# Patient Record
(demographics unavailable — no encounter records)

---

## 2025-01-22 NOTE — DISCUSSION/SUMMARY
[FreeTextEntry1] : He agrees that improvement in his tremor and gait is a benefit of the carbidopa levodopa and that he will continue this medication.  I have taken a video of his walking and the lack of tremor today.  For  For sciatica, I reminded him of his back surgery from L3-L5 with the instrumentation.  The only option for him is to try the pain management again which appeared to have worked the last time.  I have given her meloxicam for a few days.

## 2025-01-22 NOTE — HISTORY OF PRESENT ILLNESS
[FreeTextEntry1] : Followed by Dr. Oneil for back pain radiating into the legs as well as Parkinson's disease.  He has been referred for evaluation for deep brain stimulation. 4/9/2024: He reports that his tremor is unchanged and his problems are unchanged despite increasing the dose of the medications.  9/23/2024: He does not complain of tremor but does complain of burning in his feet and restless legs.  He admits to drooling at times and choking occasionally.  He notices his gait is not normal but he is independent at home and uses a cane when he walks outside.  He [place] a subway but today he used a taxi to come independently to the office for his appointment. 1/22/2025: He returns complaining that the medications do not work at home.  His most important complaint is severe back pain radiating into the back of his thigh that he calls sciatica.  It was worse earlier last week and now is slightly better.  He was unable to get out of bed because of the severe pain last week.  He has had this pain before and he was sent to physical therapy but could not complete all the sessions because he has co-pay each time which she cannot afford.  He also went to a different pain management specialist last year in April and received injections that did help him.  The surgeon who did the surgery and for spine is no longer in the area and moved to [place].  He had seen Dr. Vinson and Dr. Caron Lackey a previous MRI of the cervical spine had noted cervical stenosis.  He does have bladder problems that he consults urology for treatment.  He also does have difficulty walking but the difficulty walking has improved significantly with regulation of the days dosage of carbidopa levodopa.  He no longer complains of tremor.  He does have burning and tingling in the legs.

## 2025-01-22 NOTE — PHYSICAL EXAM
[Person] : oriented to person [Place] : oriented to place [Time] : oriented to time [Concentration Intact] : normal concentrating ability [Visual Intact] : visual attention was ~T not ~L decreased [Naming Objects] : no difficulty naming common objects [Repeating Phrases] : no difficulty repeating a phrase [Writing A Sentence] : no difficulty writing a sentence [Fluency] : fluency intact [Comprehension] : comprehension intact [Reading] : reading intact [Past History] : adequate knowledge of personal past history [Cranial Nerves Optic (II)] : visual acuity intact bilaterally,  visual fields full to confrontation, pupils equal round and reactive to light [Cranial Nerves Oculomotor (III)] : extraocular motion intact [Cranial Nerves Trigeminal (V)] : facial sensation intact symmetrically [Cranial Nerves Facial (VII)] : face symmetrical [Cranial Nerves Vestibulocochlear (VIII)] : hearing was intact bilaterally [Cranial Nerves Glossopharyngeal (IX)] : tongue and palate midline [Cranial Nerves Accessory (XI - Cranial And Spinal)] : head turning and shoulder shrug symmetric [Cranial Nerves Hypoglossal (XII)] : there was no tongue deviation with protrusion [Motor Strength] : muscle strength was normal in all four extremities [No Muscle Atrophy] : normal bulk in all four extremities [2] : Falling - 2 [3] : Sensory - 3 [1 - Slight: Loss of modulation, diction, or volume, but still all words easy to understand] : Speech - 1 [1 - Slight: Minimal masked facies manifested only by decreased frequency of blinking] : Facial expression - 1 [2 - Mild: Rigidity detected without the activation maneuver, but full range of motion is easily achieved.] : Rigidity: neck - 2 [1 - Slight: Rigidity only detected with activation maneuver] : Rigidity - Lower extremity: left - 1 [1 - Slight: Any of the following: a) the regular rhythm is broken with one or two interruptions or hesitations of the movement; b) slight slowing; c) the amplitude decrements near the end of the task.] : Hand movements: left - 1 [1 - Slight: Any of the following: a) the reqular rhytm is broken with one or two interruptions or hesitations of the movement; b) slight slowing; c) the amplitude decrements near the end of the sequence.] : Pronation-supination movements of hands: left - 1 [1 - Slight: Any of the following: a) the reqular rhythm is broken with one or two interruptions or hesitations of the tapping movement; b) slight slowing; c) the amplitude decrements near the end of the 10 taps.] : Toe tapping: left - 1 [1 - Slight: Any of the following: a) the regular rhythm is broken with one or two interruptions or hesitations of the movement; b) slight slowing; c) amplitude decrements near the end of the task.] : Leg agility: left - 1 [0 - Normal: No problems. Able to arise quickly without hesitation] : Arise from chair - 0 [2 - Mild: Independent walking but with substantial gait impairment.] : Gait - 2 [0 - Normal: No freezing] : Freezing of gait- 0 [2 - Mild: More than 5 steps, but subject recovers unaided] : Postural stability - 2 [2 - Mild: Definite flexion, scoliosis or leaning to one side, but patient can correct posture to normal posture when asked to do so.] : Posture - 2 [2 - Mild: Mild global slowness and poverty of spontaneous movements.] : Body bradykinesia - 2 [1 - Slight: Tremor is present but less than 1 cm in amplitude.] : Kinetic tremor of the hands: left - 1 [0 - Normal: No tremor.] : Tremor Amplitude: Lip/Jaw - 0 [3 - Moderate: >= 3 cm but < 10 cm in maximal amplitude.] : Rest tremor amplitude - Upper extremity: left - 3 [1 - Slight: < 1 cm in maximal amplitude] : Rest tremor amplitude - Lower extremity: left - 1 [1 - Slight: Tremor at rest is present <= 25% of the entire examination period.] : Constancy of rest tremor  - 1 [0] : Anorexia, nausea, vomiting - 0 [1] : Symptomatic Orthostasis - 1 [Sensation Tactile Decrease] : light touch was intact [Abnormal Walk] : normal gait [Balance] : balance was intact [Past-pointing] : there was no past-pointing [Tremor] : a tremor present [Dysdiadochokinesia Bilaterally] : present bilaterally [Coordination - Dysmetria Impaired Finger-to-Nose Bilateral] : not present [Coordination - Dysmetria Impaired Heel-to-Shin Bilateral] : not present [3+] : Ankle jerk right 3+ [2+] : Ankle jerk left 2+ [Plantar Reflex Right Only] : abnormal on the right [Plantar Reflex Left Only] : normal on the left [FreeTextEntry9] : 1/31/2024 [de-identified] : 199 [FreeTextEntry3] : 19 [FreeTextEntry5] : 43 [FreeTextEntry7] : 63 [de-identified] : 3 [de-identified] : 40 [Sclera] : the sclera and conjunctiva were normal [PERRL With Normal Accommodation] : pupils were equal in size, round, reactive to light, with normal accommodation [Extraocular Movements] : extraocular movements were intact [Outer Ear] : the ears and nose were normal in appearance [Oropharynx] : the oropharynx was normal [Neck Appearance] : the appearance of the neck was normal [Neck Cervical Mass (___cm)] : no neck mass was observed [Jugular Venous Distention Increased] : there was no jugular-venous distention [Thyroid Diffuse Enlargement] : the thyroid was not enlarged [Thyroid Nodule] : there were no palpable thyroid nodules [Auscultation Breath Sounds / Voice Sounds] : lungs were clear to auscultation bilaterally [Heart Rate And Rhythm] : heart rate was normal and rhythm regular [Heart Sounds] : normal S1 and S2 [Heart Sounds Gallop] : no gallops [Murmurs] : no murmurs [Heart Sounds Pericardial Friction Rub] : no pericardial rub [Full Pulse] : the pedal pulses are present [Edema] : there was no peripheral edema [Bowel Sounds] : normal bowel sounds [Abdomen Soft] : soft [Abdomen Tenderness] : non-tender [] : no hepato-splenomegaly [Abdomen Mass (___ Cm)] : no abdominal mass palpated [FreeTextEntry1] : SLRT NEGATIVE

## 2025-03-17 NOTE — HISTORY OF PRESENT ILLNESS
[FreeTextEntry1] : back pain  [de-identified] : 3/13/25: 72-year-old male with PMH Parkinson's disease, chronic lower back pain, history of L3-4, L4-5 fusion in 2015. He has been seen by Teo Mendoza and Thad. Reports worsening back pain radiating to bilateral lower extremities associated with tingling in both feet, right leg worse than the left. He states that he never had any significant improvement after surgery. Has posterior neck pain, numbness/tingling both hands, difficulty with balance. He has done physical therapy without improvement. Taking Gabapentin and Cymbalta. States he had an epidural injection about 3 months ago with mild pain relief. Following up to review most recent updated CT results for lumbar spine.  5/28/24:  72-year-old male with PMH Parkinson's disease, chronic lower back pain, history of L3-4, L4-5 fusion in 2015. He has been seen by Teo Mendoza and Thad. His back pain radiates to bilateral lower extremities associated with tingling in both feet. He states that he never had any significant improvement after surgery. He also reports posterior neck pain, numbness/tingling both hands, difficulty with balance. He has done physical therapy without improvement. Taking Gabapentin and Cymbalta.   3/28/24 71-year-old male with PMH Parkinson's disease, chronic lower back pain, history of L3-4, L4-5 fusion in 2014, presenting for neurosurgical evaluation of back and neck pain.  He has been seen by Teo Mendoza and Thad. His back pain radiates to bilateral lower extremities associated with tingling in both feet. He states that he never had any significant improvement after surgery. He also reports posterior neck pain, numbness/tingling both hands, difficulty with balance. He has done physical therapy without improvement. Taking Gabapentin and Cymbalta. No recent imaging studies for his lumbar spine. He did have an MRI about 5 years ago.  MR cervical spine with multilevel cervical spondylosis more notable at C5-6 level with moderate to severe bilateral neuroforaminal stenosis.

## 2025-03-17 NOTE — PHYSICAL EXAM
[General Appearance - Alert] : alert [General Appearance - In No Acute Distress] : in no acute distress [Oriented To Time, Place, And Person] : oriented to person, place, and time [Cranial Nerves Optic (II)] : visual acuity intact bilaterally,  pupils equal round and reactive to light [Cranial Nerves Oculomotor (III)] : extraocular motion intact [Cranial Nerves Facial (VII)] : face symmetrical [Cranial Nerves Vestibulocochlear (VIII)] : hearing was intact bilaterally [Cranial Nerves Accessory (XI - Cranial And Spinal)] : head turning and shoulder shrug symmetric [Cranial Nerves Hypoglossal (XII)] : there was no tongue deviation with protrusion [Motor Tone] : muscle tone was normal in all four extremities [Motor Strength] : muscle strength was normal in all four extremities [Sensation Tactile Decrease] : light touch was intact [Balance] : balance was intact [3+] : Ankle jerk right 3+ [2+] : Ankle jerk left 2+ [Full ROM] : full ROM [No Visual Abnormalities] : no visible abnormailities [Sclera] : the sclera and conjunctiva were normal [PERRL With Normal Accommodation] : pupils were equal in size, round, reactive to light, with normal accommodation [Extraocular Movements] : extraocular movements were intact [Outer Ear] : the ears and nose were normal in appearance [Hearing Threshold Finger Rub Not [place]] : hearing was normal [Neck Appearance] : the appearance of the neck was normal [] : no respiratory distress [Abnormal Walk] : normal gait [Skin Color & Pigmentation] : normal skin color and pigmentation [Straight-Leg Raise Test - Right] : straight leg raise  of the right leg was negative [FreeTextEntry8] : ambulates with cane [Straight-Leg Raise Test - Left] : straight leg raise of the left leg was negative [Able to toe walk] : the patient was not able to toe walk [Able to heel walk] : the patient was not able to heel walk

## 2025-03-17 NOTE — PHYSICAL EXAM
[General Appearance - Alert] : alert [General Appearance - In No Acute Distress] : in no acute distress [Oriented To Time, Place, And Person] : oriented to person, place, and time [Cranial Nerves Optic (II)] : visual acuity intact bilaterally,  pupils equal round and reactive to light [Cranial Nerves Oculomotor (III)] : extraocular motion intact [Cranial Nerves Facial (VII)] : face symmetrical [Cranial Nerves Vestibulocochlear (VIII)] : hearing was intact bilaterally [Cranial Nerves Accessory (XI - Cranial And Spinal)] : head turning and shoulder shrug symmetric [Cranial Nerves Hypoglossal (XII)] : there was no tongue deviation with protrusion [Motor Tone] : muscle tone was normal in all four extremities [Motor Strength] : muscle strength was normal in all four extremities [Sensation Tactile Decrease] : light touch was intact [Balance] : balance was intact [3+] : Ankle jerk right 3+ [2+] : Ankle jerk left 2+ [Full ROM] : full ROM [No Visual Abnormalities] : no visible abnormailities [Sclera] : the sclera and conjunctiva were normal [PERRL With Normal Accommodation] : pupils were equal in size, round, reactive to light, with normal accommodation [Extraocular Movements] : extraocular movements were intact [Outer Ear] : the ears and nose were normal in appearance [Hearing Threshold Finger Rub Not [place]] : hearing was normal [Neck Appearance] : the appearance of the neck was normal [] : no respiratory distress [Abnormal Walk] : normal gait [Skin Color & Pigmentation] : normal skin color and pigmentation [Straight-Leg Raise Test - Right] : straight leg raise  of the right leg was negative [FreeTextEntry8] : ambulates with cane [Straight-Leg Raise Test - Left] : straight leg raise of the left leg was negative [Able to toe walk] : the patient was not able to toe walk [Able to heel walk] : the patient was not able to heel walk

## 2025-03-17 NOTE — ASSESSMENT
[FreeTextEntry1] : 71 y/o M with PMH Parkinson's disease, lumbar and cervical radiculopathy, history of L3-4, L4-5 fusion in 2015. His back pain radiates to bilateral lower extremities associated with burning and tingling in both feet. Neck pain associated with tingling in both hands. He has done physical therapy without improvement. His lower back pain is more bothersome than his neck rating the lower back pain 10/10 in severity. Discussed multiple non surgical treatment options and I recommend he returns to pain management for a L2-3 trans foraminal block right or SCS trial.

## 2025-03-17 NOTE — HISTORY OF PRESENT ILLNESS
[FreeTextEntry1] : back pain  [de-identified] : 3/13/25: 72-year-old male with PMH Parkinson's disease, chronic lower back pain, history of L3-4, L4-5 fusion in 2015. He has been seen by Teo Mendoza and Thad. Reports worsening back pain radiating to bilateral lower extremities associated with tingling in both feet, right leg worse than the left. He states that he never had any significant improvement after surgery. Has posterior neck pain, numbness/tingling both hands, difficulty with balance. He has done physical therapy without improvement. Taking Gabapentin and Cymbalta. States he had an epidural injection about 3 months ago with mild pain relief. Following up to review most recent updated CT results for lumbar spine.  5/28/24:  72-year-old male with PMH Parkinson's disease, chronic lower back pain, history of L3-4, L4-5 fusion in 2015. He has been seen by Teo Mendoza and Thad. His back pain radiates to bilateral lower extremities associated with tingling in both feet. He states that he never had any significant improvement after surgery. He also reports posterior neck pain, numbness/tingling both hands, difficulty with balance. He has done physical therapy without improvement. Taking Gabapentin and Cymbalta.   3/28/24 71-year-old male with PMH Parkinson's disease, chronic lower back pain, history of L3-4, L4-5 fusion in 2014, presenting for neurosurgical evaluation of back and neck pain.  He has been seen by Teo Mendoza and Thad. His back pain radiates to bilateral lower extremities associated with tingling in both feet. He states that he never had any significant improvement after surgery. He also reports posterior neck pain, numbness/tingling both hands, difficulty with balance. He has done physical therapy without improvement. Taking Gabapentin and Cymbalta. No recent imaging studies for his lumbar spine. He did have an MRI about 5 years ago.  MR cervical spine with multilevel cervical spondylosis more notable at C5-6 level with moderate to severe bilateral neuroforaminal stenosis.

## 2025-03-17 NOTE — HISTORY OF PRESENT ILLNESS
[FreeTextEntry1] : back pain  [de-identified] : 3/13/25: 72-year-old male with PMH Parkinson's disease, chronic lower back pain, history of L3-4, L4-5 fusion in 2015. He has been seen by Teo Mendoza and Thad. Reports worsening back pain radiating to bilateral lower extremities associated with tingling in both feet, right leg worse than the left. He states that he never had any significant improvement after surgery. Has posterior neck pain, numbness/tingling both hands, difficulty with balance. He has done physical therapy without improvement. Taking Gabapentin and Cymbalta. States he had an epidural injection about 3 months ago with mild pain relief. Following up to review most recent updated CT results for lumbar spine.  5/28/24:  72-year-old male with PMH Parkinson's disease, chronic lower back pain, history of L3-4, L4-5 fusion in 2015. He has been seen by Teo Mendoza and Thad. His back pain radiates to bilateral lower extremities associated with tingling in both feet. He states that he never had any significant improvement after surgery. He also reports posterior neck pain, numbness/tingling both hands, difficulty with balance. He has done physical therapy without improvement. Taking Gabapentin and Cymbalta.   3/28/24 71-year-old male with PMH Parkinson's disease, chronic lower back pain, history of L3-4, L4-5 fusion in 2014, presenting for neurosurgical evaluation of back and neck pain.  He has been seen by Teo Mendoza and Thad. His back pain radiates to bilateral lower extremities associated with tingling in both feet. He states that he never had any significant improvement after surgery. He also reports posterior neck pain, numbness/tingling both hands, difficulty with balance. He has done physical therapy without improvement. Taking Gabapentin and Cymbalta. No recent imaging studies for his lumbar spine. He did have an MRI about 5 years ago.  MR cervical spine with multilevel cervical spondylosis more notable at C5-6 level with moderate to severe bilateral neuroforaminal stenosis.

## 2025-03-17 NOTE — ASSESSMENT
[FreeTextEntry1] : 73 y/o M with PMH Parkinson's disease, lumbar and cervical radiculopathy, history of L3-4, L4-5 fusion in 2015. His back pain radiates to bilateral lower extremities associated with burning and tingling in both feet. Neck pain associated with tingling in both hands. He has done physical therapy without improvement. His lower back pain is more bothersome than his neck rating the lower back pain 10/10 in severity. Discussed multiple non surgical treatment options and I recommend he returns to pain management for a L2-3 trans foraminal block right or SCS trial.

## 2025-03-17 NOTE — RESULTS/DATA
[FreeTextEntry1] : CT Lumbar 6/4/24: Posterior instrumented fusion from L3 through L5 with solid anterior and posterior osseous fusion and no hardware complication.There are tracks within the left-sided pedicle of L4 from prior hardware placement and removal. There is also prominent productive change/fusion mass emanating from this pedicle and the disc space causing moderate left osseous foraminal narrowing. Mild disc bulging and retrolisthesis at L5-S1 with facet arthrosis resulting in moderate to severe right and moderate left foraminal narrowing.

## 2025-03-17 NOTE — PHYSICAL EXAM
[General Appearance - Alert] : alert [General Appearance - In No Acute Distress] : in no acute distress [Oriented To Time, Place, And Person] : oriented to person, place, and time [Cranial Nerves Optic (II)] : visual acuity intact bilaterally,  pupils equal round and reactive to light [Cranial Nerves Oculomotor (III)] : extraocular motion intact [Cranial Nerves Facial (VII)] : face symmetrical [Cranial Nerves Vestibulocochlear (VIII)] : hearing was intact bilaterally [Cranial Nerves Accessory (XI - Cranial And Spinal)] : head turning and shoulder shrug symmetric [Cranial Nerves Hypoglossal (XII)] : there was no tongue deviation with protrusion [Motor Tone] : muscle tone was normal in all four extremities [Motor Strength] : muscle strength was normal in all four extremities [Sensation Tactile Decrease] : light touch was intact [Balance] : balance was intact [3+] : Ankle jerk right 3+ [2+] : Ankle jerk left 2+ [Full ROM] : full ROM [No Visual Abnormalities] : no visible abnormailities [Sclera] : the sclera and conjunctiva were normal [PERRL With Normal Accommodation] : pupils were equal in size, round, reactive to light, with normal accommodation [Extraocular Movements] : extraocular movements were intact [Outer Ear] : the ears and nose were normal in appearance [Hearing Threshold Finger Rub Not [place]] : hearing was normal [Neck Appearance] : the appearance of the neck was normal [] : no respiratory distress [Abnormal Walk] : normal gait [Skin Color & Pigmentation] : normal skin color and pigmentation [Straight-Leg Raise Test - Right] : straight leg raise  of the right leg was negative [FreeTextEntry8] : ambulates with cane [Straight-Leg Raise Test - Left] : straight leg raise of the left leg was negative [Able to toe walk] : the patient was not able to toe walk [Able to heel walk] : the patient was not able to heel walk

## 2025-03-17 NOTE — PHYSICAL EXAM
[General Appearance - Alert] : alert [General Appearance - In No Acute Distress] : in no acute distress [Oriented To Time, Place, And Person] : oriented to person, place, and time [Cranial Nerves Optic (II)] : visual acuity intact bilaterally,  pupils equal round and reactive to light [Cranial Nerves Oculomotor (III)] : extraocular motion intact [Cranial Nerves Facial (VII)] : face symmetrical [Cranial Nerves Vestibulocochlear (VIII)] : hearing was intact bilaterally [Cranial Nerves Accessory (XI - Cranial And Spinal)] : head turning and shoulder shrug symmetric [Cranial Nerves Hypoglossal (XII)] : there was no tongue deviation with protrusion [Motor Tone] : muscle tone was normal in all four extremities [Motor Strength] : muscle strength was normal in all four extremities [Sensation Tactile Decrease] : light touch was intact [Balance] : balance was intact [3+] : Ankle jerk right 3+ [2+] : Ankle jerk left 2+ [Full ROM] : full ROM [No Visual Abnormalities] : no visible abnormailities [Sclera] : the sclera and conjunctiva were normal [PERRL With Normal Accommodation] : pupils were equal in size, round, reactive to light, with normal accommodation [Extraocular Movements] : extraocular movements were intact [Outer Ear] : the ears and nose were normal in appearance [Hearing Threshold Finger Rub Not [place]] : hearing was normal [Neck Appearance] : the appearance of the neck was normal [] : no respiratory distress [Abnormal Walk] : normal gait [Skin Color & Pigmentation] : normal skin color and pigmentation [Straight-Leg Raise Test - Right] : straight leg raise  of the right leg was negative [FreeTextEntry8] : ambulates with cane [Straight-Leg Raise Test - Left] : straight leg raise of the left leg was negative [Able to toe walk] : the patient was not able to toe walk [Able to heel walk] : the patient was not able to heel walk

## 2025-03-17 NOTE — HISTORY OF PRESENT ILLNESS
[FreeTextEntry1] : back pain  [de-identified] : 3/13/25: 72-year-old male with PMH Parkinson's disease, chronic lower back pain, history of L3-4, L4-5 fusion in 2015. He has been seen by Teo Mendoza and Thad. Reports worsening back pain radiating to bilateral lower extremities associated with tingling in both feet, right leg worse than the left. He states that he never had any significant improvement after surgery. Has posterior neck pain, numbness/tingling both hands, difficulty with balance. He has done physical therapy without improvement. Taking Gabapentin and Cymbalta. States he had an epidural injection about 3 months ago with mild pain relief. Following up to review most recent updated CT results for lumbar spine.  5/28/24:  72-year-old male with PMH Parkinson's disease, chronic lower back pain, history of L3-4, L4-5 fusion in 2015. He has been seen by Teo Mendoza and Thad. His back pain radiates to bilateral lower extremities associated with tingling in both feet. He states that he never had any significant improvement after surgery. He also reports posterior neck pain, numbness/tingling both hands, difficulty with balance. He has done physical therapy without improvement. Taking Gabapentin and Cymbalta.   3/28/24 71-year-old male with PMH Parkinson's disease, chronic lower back pain, history of L3-4, L4-5 fusion in 2014, presenting for neurosurgical evaluation of back and neck pain.  He has been seen by Teo Mendoza and Thad. His back pain radiates to bilateral lower extremities associated with tingling in both feet. He states that he never had any significant improvement after surgery. He also reports posterior neck pain, numbness/tingling both hands, difficulty with balance. He has done physical therapy without improvement. Taking Gabapentin and Cymbalta. No recent imaging studies for his lumbar spine. He did have an MRI about 5 years ago.  MR cervical spine with multilevel cervical spondylosis more notable at C5-6 level with moderate to severe bilateral neuroforaminal stenosis.

## 2025-03-26 NOTE — HISTORY OF PRESENT ILLNESS
[FreeTextEntry1] : Followed by Dr. Oneil for back pain radiating into the legs as well as Parkinson's disease.  He has been referred for evaluation for deep brain stimulation. 4/9/2024: He reports that his tremor is unchanged and his problems are unchanged despite increasing the dose of the medications.  9/23/2024: He does not complain of tremor but does complain of burning in his feet and restless legs.  He admits to drooling at times and choking occasionally.  He notices his gait is not normal but he is independent at home and uses a cane when he walks outside.  [place] a subway but today he used a taxi to come independently to the office for his appointment. 1/22/2025: He returns complaining that the medications do not work at home.  His most important complaint is severe back pain radiating into the back of his thigh that he calls sciatica.  It was worse earlier last week and now is slightly better.  He was unable to get out of bed because of the severe pain last week.  He has had this pain before and he was sent to physical therapy but could not complete all the sessions because he has co-pay each time which she cannot afford.  He also went to a different pain management specialist last year in April and received injections that did help him.  The surgeon who did the surgery and for spine is no longer in the area and moved to [place].  He had seen Dr. Vinson and Dr. Caron Lackey a previous MRI of the cervical spine had noted cervical stenosis.  He does have bladder problems that he consults urology for treatment.  He also does have difficulty walking but the difficulty walking has improved significantly with regulation of the days dosage of carbidopa levodopa.  He no longer complains of tremor.  He does have burning and tingling in the legs. Verbal consent given on 03/26/2025 at 15:36 by SHANICE KUO, ~  ~. 3/26/2025: He complains he continues to shake intermittently.  But he has not maintained a record of when he begins shaking and when the shaking stops.  This request had been made at the time of the last visit to discover whether his off phenomenon were end of dose off phenomenon or not.  He has seen Dr. Vinson regarding his spine disease (lumbar and cervical spine) and is following up with Dr. Vinson.

## 2025-03-26 NOTE — DISCUSSION/SUMMARY
[FreeTextEntry1] : Unfortunately he could not hear clearly and could not repeat my instructions to keep a symptom diary with time of medication administration and onset as well as offset of tremor.  I shall arrange for an in face appointment to help him understand.  He might need home care to manage monitor and manage his condition because of his age and hearing problems.  Unfortunately also his phone call was disconnected as we were trying to convert to a telemetry prompt.

## 2025-05-22 NOTE — HISTORY OF PRESENT ILLNESS
[FreeTextEntry1] : Followed by Dr. Oneil for back pain radiating into the legs as well as Parkinson's disease.  He has been referred for evaluation for deep brain stimulation. 4/9/2024: He reports that his tremor is unchanged and his problems are unchanged despite increasing the dose of the medications.  9/23/2024: He does not complain of tremor but does complain of burning in his feet and restless legs.  He admits to drooling at times and choking occasionally.  He notices his gait is not normal but he is independent at home and uses a cane when he walks outside.  [place] he used a taxi to come independently to the office for his appointment. 1/22/2025: He returns complaining that the medications do not work at home.  His most important complaint is severe back pain radiating into the back of his thigh that he calls sciatica.  It was worse earlier last week and now is slightly better.  He was unable to get out of bed because of the severe pain last week.  He has had this pain before and he was sent to physical therapy but could not complete all the sessions because he has co-pay each time which she cannot afford.  He also went to a different pain management specialist last year in April and received injections that did help him.  The surgeon who did the surgery and for spine is no longer in the area and moved to [place].  He had seen Dr. Vinson and Dr. Caron Lackey a previous MRI of the cervical spine had noted cervical stenosis.  He does have bladder problems that he consults urology for treatment.  He also does have difficulty walking but the difficulty walking has improved significantly with regulation of the days dosage of carbidopa levodopa.  He no longer complains of tremor.  He does have burning and tingling in the legs. Verbal consent given on 03/26/2025 at 15:36 by SHANICE KUO, ~  ~. 3/26/2025: He complains he continues to shake intermittently.  But he has not maintained a record of when he begins shaking and when the shaking stops.  This request had been made at the time of the last visit to discover whether his off phenomenon were end of dose off phenomenon or not.  He has seen Dr. Vinson regarding his spine disease (lumbar and cervical spine) and is following up with Dr. Vinson. 5/14/2025: His wife was present.  He reports that he is worse.  He does not have pain in his feet  He reports

## 2025-05-22 NOTE — PHYSICAL EXAM
[Person] : oriented to person [Place] : oriented to place [Time] : oriented to time [Concentration Intact] : normal concentrating ability [Visual Intact] : visual attention was ~T not ~L decreased [Naming Objects] : no difficulty naming common objects [Repeating Phrases] : no difficulty repeating a phrase [Writing A Sentence] : no difficulty writing a sentence [Fluency] : fluency intact [Comprehension] : comprehension intact [Reading] : reading intact [Past History] : adequate knowledge of personal past history [Cranial Nerves Optic (II)] : visual acuity intact bilaterally,  visual fields full to confrontation, pupils equal round and reactive to light [Cranial Nerves Oculomotor (III)] : extraocular motion intact [Cranial Nerves Trigeminal (V)] : facial sensation intact symmetrically [Cranial Nerves Facial (VII)] : face symmetrical [Cranial Nerves Vestibulocochlear (VIII)] : hearing was intact bilaterally [Cranial Nerves Glossopharyngeal (IX)] : tongue and palate midline [Cranial Nerves Accessory (XI - Cranial And Spinal)] : head turning and shoulder shrug symmetric [Cranial Nerves Hypoglossal (XII)] : there was no tongue deviation with protrusion [Motor Strength] : muscle strength was normal in all four extremities [No Muscle Atrophy] : normal bulk in all four extremities [2] : Falling - 2 [3] : Sensory - 3 [1 - Slight: Loss of modulation, diction, or volume, but still all words easy to understand] : Speech - 1 [1 - Slight: Minimal masked facies manifested only by decreased frequency of blinking] : Facial expression - 1 [2 - Mild: Rigidity detected without the activation maneuver, but full range of motion is easily achieved.] : Rigidity: neck - 2 [1 - Slight: Rigidity only detected with activation maneuver] : Rigidity - Lower extremity: left - 1 [1 - Slight: Any of the following: a) the regular rhythm is broken with one or two interruptions or hesitations of the movement; b) slight slowing; c) the amplitude decrements near the end of the task.] : Hand movements: left - 1 [1 - Slight: Any of the following: a) the reqular rhytm is broken with one or two interruptions or hesitations of the movement; b) slight slowing; c) the amplitude decrements near the end of the sequence.] : Pronation-supination movements of hands: left - 1 [1 - Slight: Any of the following: a) the reqular rhythm is broken with one or two interruptions or hesitations of the tapping movement; b) slight slowing; c) the amplitude decrements near the end of the 10 taps.] : Toe tapping: left - 1 [1 - Slight: Any of the following: a) the regular rhythm is broken with one or two interruptions or hesitations of the movement; b) slight slowing; c) amplitude decrements near the end of the task.] : Leg agility: left - 1 [0 - Normal: No problems. Able to arise quickly without hesitation] : Arise from chair - 0 [2 - Mild: Independent walking but with substantial gait impairment.] : Gait - 2 [0 - Normal: No freezing] : Freezing of gait- 0 [2 - Mild: More than 5 steps, but subject recovers unaided] : Postural stability - 2 [2 - Mild: Definite flexion, scoliosis or leaning to one side, but patient can correct posture to normal posture when asked to do so.] : Posture - 2 [2 - Mild: Mild global slowness and poverty of spontaneous movements.] : Body bradykinesia - 2 [1 - Slight: Tremor is present but less than 1 cm in amplitude.] : Kinetic tremor of the hands: left - 1 [0 - Normal: No tremor.] : Tremor Amplitude: Lip/Jaw - 0 [3 - Moderate: >= 3 cm but < 10 cm in maximal amplitude.] : Rest tremor amplitude - Upper extremity: left - 3 [1 - Slight: < 1 cm in maximal amplitude] : Rest tremor amplitude - Lower extremity: left - 1 [1 - Slight: Tremor at rest is present <= 25% of the entire examination period.] : Constancy of rest tremor  - 1 [0] : Anorexia, nausea, vomiting - 0 [1] : Symptomatic Orthostasis - 1 [Sensation Tactile Decrease] : light touch was intact [Abnormal Walk] : normal gait [Balance] : balance was intact [Tremor] : a tremor present [Dysdiadochokinesia Bilaterally] : present bilaterally [3+] : Ankle jerk right 3+ [2+] : Ankle jerk left 2+ [Plantar Reflex Right Only] : abnormal on the right [Sclera] : the sclera and conjunctiva were normal [PERRL With Normal Accommodation] : pupils were equal in size, round, reactive to light, with normal accommodation [Extraocular Movements] : extraocular movements were intact [Neck Appearance] : the appearance of the neck was normal [Neck Cervical Mass (___cm)] : no neck mass was observed [Jugular Venous Distention Increased] : there was no jugular-venous distention [Thyroid Diffuse Enlargement] : the thyroid was not enlarged [Thyroid Nodule] : there were no palpable thyroid nodules [] : no respiratory distress [Auscultation Breath Sounds / Voice Sounds] : lungs were clear to auscultation bilaterally [Heart Rate And Rhythm] : heart rate was normal and rhythm regular [Heart Sounds] : normal S1 and S2 [Heart Sounds Gallop] : no gallops [Murmurs] : no murmurs [Heart Sounds Pericardial Friction Rub] : no pericardial rub [Full Pulse] : the pedal pulses are present [Edema] : there was no peripheral edema [Past-pointing] : there was no past-pointing [Coordination - Dysmetria Impaired Finger-to-Nose Bilateral] : not present [Coordination - Dysmetria Impaired Heel-to-Shin Bilateral] : not present [Plantar Reflex Left Only] : normal on the left [FreeTextEntry9] : 1/31/2024 [de-identified] : [id number] [FreeTextEntry1] : 1 [FreeTextEntry3] : 19 [FreeTextEntry5] : 43 [FreeTextEntry7] : 63 [de-identified] : 3 [de-identified] : 40

## 2025-05-22 NOTE — PHYSICAL EXAM
[Person] : oriented to person [Place] : oriented to place [Time] : oriented to time [Concentration Intact] : normal concentrating ability [Visual Intact] : visual attention was ~T not ~L decreased [Naming Objects] : no difficulty naming common objects [Repeating Phrases] : no difficulty repeating a phrase [Writing A Sentence] : no difficulty writing a sentence [Fluency] : fluency intact [Comprehension] : comprehension intact [Reading] : reading intact [Past History] : adequate knowledge of personal past history [Cranial Nerves Optic (II)] : visual acuity intact bilaterally,  visual fields full to confrontation, pupils equal round and reactive to light [Cranial Nerves Oculomotor (III)] : extraocular motion intact [Cranial Nerves Trigeminal (V)] : facial sensation intact symmetrically [Cranial Nerves Facial (VII)] : face symmetrical [Cranial Nerves Vestibulocochlear (VIII)] : hearing was intact bilaterally [Cranial Nerves Glossopharyngeal (IX)] : tongue and palate midline [Cranial Nerves Accessory (XI - Cranial And Spinal)] : head turning and shoulder shrug symmetric [Cranial Nerves Hypoglossal (XII)] : there was no tongue deviation with protrusion [Motor Strength] : muscle strength was normal in all four extremities [No Muscle Atrophy] : normal bulk in all four extremities [2] : Falling - 2 [3] : Sensory - 3 [1 - Slight: Loss of modulation, diction, or volume, but still all words easy to understand] : Speech - 1 [1 - Slight: Minimal masked facies manifested only by decreased frequency of blinking] : Facial expression - 1 [2 - Mild: Rigidity detected without the activation maneuver, but full range of motion is easily achieved.] : Rigidity: neck - 2 [1 - Slight: Rigidity only detected with activation maneuver] : Rigidity - Lower extremity: left - 1 [1 - Slight: Any of the following: a) the regular rhythm is broken with one or two interruptions or hesitations of the movement; b) slight slowing; c) the amplitude decrements near the end of the task.] : Hand movements: left - 1 [1 - Slight: Any of the following: a) the reqular rhytm is broken with one or two interruptions or hesitations of the movement; b) slight slowing; c) the amplitude decrements near the end of the sequence.] : Pronation-supination movements of hands: left - 1 [1 - Slight: Any of the following: a) the reqular rhythm is broken with one or two interruptions or hesitations of the tapping movement; b) slight slowing; c) the amplitude decrements near the end of the 10 taps.] : Toe tapping: left - 1 [1 - Slight: Any of the following: a) the regular rhythm is broken with one or two interruptions or hesitations of the movement; b) slight slowing; c) amplitude decrements near the end of the task.] : Leg agility: left - 1 [0 - Normal: No problems. Able to arise quickly without hesitation] : Arise from chair - 0 [2 - Mild: Independent walking but with substantial gait impairment.] : Gait - 2 [0 - Normal: No freezing] : Freezing of gait- 0 [2 - Mild: More than 5 steps, but subject recovers unaided] : Postural stability - 2 [2 - Mild: Definite flexion, scoliosis or leaning to one side, but patient can correct posture to normal posture when asked to do so.] : Posture - 2 [2 - Mild: Mild global slowness and poverty of spontaneous movements.] : Body bradykinesia - 2 [1 - Slight: Tremor is present but less than 1 cm in amplitude.] : Kinetic tremor of the hands: left - 1 [0 - Normal: No tremor.] : Tremor Amplitude: Lip/Jaw - 0 [3 - Moderate: >= 3 cm but < 10 cm in maximal amplitude.] : Rest tremor amplitude - Upper extremity: left - 3 [1 - Slight: < 1 cm in maximal amplitude] : Rest tremor amplitude - Lower extremity: left - 1 [1 - Slight: Tremor at rest is present <= 25% of the entire examination period.] : Constancy of rest tremor  - 1 [0] : Anorexia, nausea, vomiting - 0 [1] : Symptomatic Orthostasis - 1 [Sensation Tactile Decrease] : light touch was intact [Abnormal Walk] : normal gait [Balance] : balance was intact [Tremor] : a tremor present [Dysdiadochokinesia Bilaterally] : present bilaterally [3+] : Ankle jerk right 3+ [2+] : Ankle jerk left 2+ [Plantar Reflex Right Only] : abnormal on the right [Sclera] : the sclera and conjunctiva were normal [PERRL With Normal Accommodation] : pupils were equal in size, round, reactive to light, with normal accommodation [Extraocular Movements] : extraocular movements were intact [Neck Appearance] : the appearance of the neck was normal [Neck Cervical Mass (___cm)] : no neck mass was observed [Jugular Venous Distention Increased] : there was no jugular-venous distention [Thyroid Diffuse Enlargement] : the thyroid was not enlarged [Thyroid Nodule] : there were no palpable thyroid nodules [] : no respiratory distress [Auscultation Breath Sounds / Voice Sounds] : lungs were clear to auscultation bilaterally [Heart Rate And Rhythm] : heart rate was normal and rhythm regular [Heart Sounds] : normal S1 and S2 [Heart Sounds Gallop] : no gallops [Murmurs] : no murmurs [Heart Sounds Pericardial Friction Rub] : no pericardial rub [Full Pulse] : the pedal pulses are present [Edema] : there was no peripheral edema [Past-pointing] : there was no past-pointing [Coordination - Dysmetria Impaired Finger-to-Nose Bilateral] : not present [Coordination - Dysmetria Impaired Heel-to-Shin Bilateral] : not present [Plantar Reflex Left Only] : normal on the left [FreeTextEntry9] : 1/31/2024 [de-identified] : 257 [FreeTextEntry1] : 1 [FreeTextEntry3] : 19 [FreeTextEntry5] : 43 [FreeTextEntry7] : 63 [de-identified] : 3 [de-identified] : 40

## 2025-05-22 NOTE — PHYSICAL EXAM
[Person] : oriented to person [Place] : oriented to place [Time] : oriented to time [Concentration Intact] : normal concentrating ability [Visual Intact] : visual attention was ~T not ~L decreased [Naming Objects] : no difficulty naming common objects [Repeating Phrases] : no difficulty repeating a phrase [Writing A Sentence] : no difficulty writing a sentence [Fluency] : fluency intact [Comprehension] : comprehension intact [Reading] : reading intact [Past History] : adequate knowledge of personal past history [Cranial Nerves Optic (II)] : visual acuity intact bilaterally,  visual fields full to confrontation, pupils equal round and reactive to light [Cranial Nerves Oculomotor (III)] : extraocular motion intact [Cranial Nerves Trigeminal (V)] : facial sensation intact symmetrically [Cranial Nerves Facial (VII)] : face symmetrical [Cranial Nerves Vestibulocochlear (VIII)] : hearing was intact bilaterally [Cranial Nerves Glossopharyngeal (IX)] : tongue and palate midline [Cranial Nerves Accessory (XI - Cranial And Spinal)] : head turning and shoulder shrug symmetric [Cranial Nerves Hypoglossal (XII)] : there was no tongue deviation with protrusion [Motor Strength] : muscle strength was normal in all four extremities [No Muscle Atrophy] : normal bulk in all four extremities [2] : Falling - 2 [3] : Sensory - 3 [1 - Slight: Loss of modulation, diction, or volume, but still all words easy to understand] : Speech - 1 [1 - Slight: Minimal masked facies manifested only by decreased frequency of blinking] : Facial expression - 1 [2 - Mild: Rigidity detected without the activation maneuver, but full range of motion is easily achieved.] : Rigidity: neck - 2 [1 - Slight: Rigidity only detected with activation maneuver] : Rigidity - Lower extremity: left - 1 [1 - Slight: Any of the following: a) the regular rhythm is broken with one or two interruptions or hesitations of the movement; b) slight slowing; c) the amplitude decrements near the end of the task.] : Hand movements: left - 1 [1 - Slight: Any of the following: a) the reqular rhytm is broken with one or two interruptions or hesitations of the movement; b) slight slowing; c) the amplitude decrements near the end of the sequence.] : Pronation-supination movements of hands: left - 1 [1 - Slight: Any of the following: a) the reqular rhythm is broken with one or two interruptions or hesitations of the tapping movement; b) slight slowing; c) the amplitude decrements near the end of the 10 taps.] : Toe tapping: left - 1 [1 - Slight: Any of the following: a) the regular rhythm is broken with one or two interruptions or hesitations of the movement; b) slight slowing; c) amplitude decrements near the end of the task.] : Leg agility: left - 1 [0 - Normal: No problems. Able to arise quickly without hesitation] : Arise from chair - 0 [2 - Mild: Independent walking but with substantial gait impairment.] : Gait - 2 [0 - Normal: No freezing] : Freezing of gait- 0 [2 - Mild: More than 5 steps, but subject recovers unaided] : Postural stability - 2 [2 - Mild: Definite flexion, scoliosis or leaning to one side, but patient can correct posture to normal posture when asked to do so.] : Posture - 2 [2 - Mild: Mild global slowness and poverty of spontaneous movements.] : Body bradykinesia - 2 [1 - Slight: Tremor is present but less than 1 cm in amplitude.] : Kinetic tremor of the hands: left - 1 [0 - Normal: No tremor.] : Tremor Amplitude: Lip/Jaw - 0 [3 - Moderate: >= 3 cm but < 10 cm in maximal amplitude.] : Rest tremor amplitude - Upper extremity: left - 3 [1 - Slight: < 1 cm in maximal amplitude] : Rest tremor amplitude - Lower extremity: left - 1 [1 - Slight: Tremor at rest is present <= 25% of the entire examination period.] : Constancy of rest tremor  - 1 [0] : Anorexia, nausea, vomiting - 0 [1] : Symptomatic Orthostasis - 1 [Sensation Tactile Decrease] : light touch was intact [Abnormal Walk] : normal gait [Balance] : balance was intact [Tremor] : a tremor present [Dysdiadochokinesia Bilaterally] : present bilaterally [3+] : Ankle jerk right 3+ [2+] : Ankle jerk left 2+ [Plantar Reflex Right Only] : abnormal on the right [Sclera] : the sclera and conjunctiva were normal [PERRL With Normal Accommodation] : pupils were equal in size, round, reactive to light, with normal accommodation [Extraocular Movements] : extraocular movements were intact [Neck Appearance] : the appearance of the neck was normal [Neck Cervical Mass (___cm)] : no neck mass was observed [Jugular Venous Distention Increased] : there was no jugular-venous distention [Thyroid Diffuse Enlargement] : the thyroid was not enlarged [Thyroid Nodule] : there were no palpable thyroid nodules [] : no respiratory distress [Auscultation Breath Sounds / Voice Sounds] : lungs were clear to auscultation bilaterally [Heart Rate And Rhythm] : heart rate was normal and rhythm regular [Heart Sounds] : normal S1 and S2 [Heart Sounds Gallop] : no gallops [Murmurs] : no murmurs [Heart Sounds Pericardial Friction Rub] : no pericardial rub [Full Pulse] : the pedal pulses are present [Edema] : there was no peripheral edema [Past-pointing] : there was no past-pointing [Coordination - Dysmetria Impaired Finger-to-Nose Bilateral] : not present [Coordination - Dysmetria Impaired Heel-to-Shin Bilateral] : not present [Plantar Reflex Left Only] : normal on the left [FreeTextEntry9] : 1/31/2024 [de-identified] : 093 [FreeTextEntry1] : 1 [FreeTextEntry3] : 19 [FreeTextEntry5] : 43 [FreeTextEntry7] : 63 [de-identified] : 3 [de-identified] : 40

## 2025-05-22 NOTE — REVIEW OF SYSTEMS
[Poor Coordination] : poor coordination [Difficulty Writing] : difficulty writing [Numbness] : numbness [Difficulty Walking] : difficulty walking [Arthralgias] : arthralgias [Joint Pain] : joint pain [Joint Swelling] : joint swelling [Joint Stiffness] : joint stiffness [Limb Pain] : limb pain [Negative] : Heme/Lymph [Confused or Disoriented] : no confusion [Memory Lapses or Loss] : no memory loss [Facial Weakness] : no facial weakness [Arm Weakness] : no arm weakness [Hand Weakness] : no hand weakness [Leg Weakness] : no leg weakness [Difficulties in Speech] : no speech difficulties [Seizures] : no convulsions [Dizziness] : no dizziness [Fainting] : no fainting [Lightheadedness] : no lightheadedness [Vertigo] : no vertigo [Migraine Headache] : no migraine headache [Inability to Walk] : able to walk [Ataxia] : no ataxia [Frequent Falls] : not falling

## 2025-05-22 NOTE — DISCUSSION/SUMMARY
[FreeTextEntry1] : Per my observation of his gait his condition has not deteriorated but neither has it improved very much.  He is on a high dose of carbidopa levodopa ( mg every 3 hours and nighttime extended-release dose).  He has not yet made an appointment with the deep brain stimulation group.  I have recommended and arranged for an appointment with Dr. Lam for DBS evaluation.

## 2025-05-22 NOTE — PHYSICAL EXAM
[Person] : oriented to person [Place] : oriented to place [Time] : oriented to time [Concentration Intact] : normal concentrating ability [Visual Intact] : visual attention was ~T not ~L decreased [Naming Objects] : no difficulty naming common objects [Repeating Phrases] : no difficulty repeating a phrase [Writing A Sentence] : no difficulty writing a sentence [Fluency] : fluency intact [Comprehension] : comprehension intact [Reading] : reading intact [Past History] : adequate knowledge of personal past history [Cranial Nerves Optic (II)] : visual acuity intact bilaterally,  visual fields full to confrontation, pupils equal round and reactive to light [Cranial Nerves Oculomotor (III)] : extraocular motion intact [Cranial Nerves Trigeminal (V)] : facial sensation intact symmetrically [Cranial Nerves Facial (VII)] : face symmetrical [Cranial Nerves Vestibulocochlear (VIII)] : hearing was intact bilaterally [Cranial Nerves Glossopharyngeal (IX)] : tongue and palate midline [Cranial Nerves Accessory (XI - Cranial And Spinal)] : head turning and shoulder shrug symmetric [Cranial Nerves Hypoglossal (XII)] : there was no tongue deviation with protrusion [Motor Strength] : muscle strength was normal in all four extremities [No Muscle Atrophy] : normal bulk in all four extremities [2] : Falling - 2 [3] : Sensory - 3 [1 - Slight: Loss of modulation, diction, or volume, but still all words easy to understand] : Speech - 1 [1 - Slight: Minimal masked facies manifested only by decreased frequency of blinking] : Facial expression - 1 [2 - Mild: Rigidity detected without the activation maneuver, but full range of motion is easily achieved.] : Rigidity: neck - 2 [1 - Slight: Rigidity only detected with activation maneuver] : Rigidity - Lower extremity: left - 1 [1 - Slight: Any of the following: a) the regular rhythm is broken with one or two interruptions or hesitations of the movement; b) slight slowing; c) the amplitude decrements near the end of the task.] : Hand movements: left - 1 [1 - Slight: Any of the following: a) the reqular rhytm is broken with one or two interruptions or hesitations of the movement; b) slight slowing; c) the amplitude decrements near the end of the sequence.] : Pronation-supination movements of hands: left - 1 [1 - Slight: Any of the following: a) the reqular rhythm is broken with one or two interruptions or hesitations of the tapping movement; b) slight slowing; c) the amplitude decrements near the end of the 10 taps.] : Toe tapping: left - 1 [1 - Slight: Any of the following: a) the regular rhythm is broken with one or two interruptions or hesitations of the movement; b) slight slowing; c) amplitude decrements near the end of the task.] : Leg agility: left - 1 [0 - Normal: No problems. Able to arise quickly without hesitation] : Arise from chair - 0 [2 - Mild: Independent walking but with substantial gait impairment.] : Gait - 2 [0 - Normal: No freezing] : Freezing of gait- 0 [2 - Mild: More than 5 steps, but subject recovers unaided] : Postural stability - 2 [2 - Mild: Definite flexion, scoliosis or leaning to one side, but patient can correct posture to normal posture when asked to do so.] : Posture - 2 [2 - Mild: Mild global slowness and poverty of spontaneous movements.] : Body bradykinesia - 2 [1 - Slight: Tremor is present but less than 1 cm in amplitude.] : Kinetic tremor of the hands: left - 1 [0 - Normal: No tremor.] : Tremor Amplitude: Lip/Jaw - 0 [3 - Moderate: >= 3 cm but < 10 cm in maximal amplitude.] : Rest tremor amplitude - Upper extremity: left - 3 [1 - Slight: < 1 cm in maximal amplitude] : Rest tremor amplitude - Lower extremity: left - 1 [1 - Slight: Tremor at rest is present <= 25% of the entire examination period.] : Constancy of rest tremor  - 1 [0] : Anorexia, nausea, vomiting - 0 [1] : Symptomatic Orthostasis - 1 [Sensation Tactile Decrease] : light touch was intact [Abnormal Walk] : normal gait [Balance] : balance was intact [Tremor] : a tremor present [Dysdiadochokinesia Bilaterally] : present bilaterally [3+] : Ankle jerk right 3+ [2+] : Ankle jerk left 2+ [Plantar Reflex Right Only] : abnormal on the right [Sclera] : the sclera and conjunctiva were normal [PERRL With Normal Accommodation] : pupils were equal in size, round, reactive to light, with normal accommodation [Extraocular Movements] : extraocular movements were intact [Neck Appearance] : the appearance of the neck was normal [Neck Cervical Mass (___cm)] : no neck mass was observed [Jugular Venous Distention Increased] : there was no jugular-venous distention [Thyroid Diffuse Enlargement] : the thyroid was not enlarged [Thyroid Nodule] : there were no palpable thyroid nodules [] : no respiratory distress [Auscultation Breath Sounds / Voice Sounds] : lungs were clear to auscultation bilaterally [Heart Rate And Rhythm] : heart rate was normal and rhythm regular [Heart Sounds] : normal S1 and S2 [Heart Sounds Gallop] : no gallops [Murmurs] : no murmurs [Heart Sounds Pericardial Friction Rub] : no pericardial rub [Full Pulse] : the pedal pulses are present [Edema] : there was no peripheral edema [Past-pointing] : there was no past-pointing [Coordination - Dysmetria Impaired Finger-to-Nose Bilateral] : not present [Coordination - Dysmetria Impaired Heel-to-Shin Bilateral] : not present [Plantar Reflex Left Only] : normal on the left [FreeTextEntry9] : 1/31/2024 [de-identified] : 261 [FreeTextEntry1] : 1 [FreeTextEntry3] : 19 [FreeTextEntry5] : 43 [FreeTextEntry7] : 63 [de-identified] : 3 [de-identified] : 40